# Patient Record
Sex: FEMALE | Race: WHITE | NOT HISPANIC OR LATINO | Employment: FULL TIME | URBAN - METROPOLITAN AREA
[De-identification: names, ages, dates, MRNs, and addresses within clinical notes are randomized per-mention and may not be internally consistent; named-entity substitution may affect disease eponyms.]

---

## 2017-05-10 ENCOUNTER — OFFICE VISIT (OUTPATIENT)
Dept: URGENT CARE | Facility: CLINIC | Age: 22
End: 2017-05-10
Payer: COMMERCIAL

## 2017-05-10 VITALS
RESPIRATION RATE: 20 BRPM | TEMPERATURE: 97.4 F | WEIGHT: 118 LBS | SYSTOLIC BLOOD PRESSURE: 120 MMHG | HEART RATE: 98 BPM | DIASTOLIC BLOOD PRESSURE: 80 MMHG | BODY MASS INDEX: 21.58 KG/M2 | OXYGEN SATURATION: 98 %

## 2017-05-10 DIAGNOSIS — M62.838 MUSCLE SPASM: ICD-10-CM

## 2017-05-10 DIAGNOSIS — M54.6 ACUTE BILATERAL THORACIC BACK PAIN: ICD-10-CM

## 2017-05-10 PROCEDURE — 99213 OFFICE O/P EST LOW 20 MIN: CPT | Mod: 25 | Performed by: PHYSICIAN ASSISTANT

## 2017-05-10 PROCEDURE — 96372 THER/PROPH/DIAG INJ SC/IM: CPT | Performed by: PHYSICIAN ASSISTANT

## 2017-05-10 RX ORDER — KETOROLAC TROMETHAMINE 30 MG/ML
60 INJECTION, SOLUTION INTRAMUSCULAR; INTRAVENOUS ONCE
Status: COMPLETED | OUTPATIENT
Start: 2017-05-10 | End: 2017-05-10

## 2017-05-10 RX ORDER — BACLOFEN 10 MG/1
10 TABLET ORAL 3 TIMES DAILY
Qty: 15 TAB | Refills: 0 | Status: SHIPPED | OUTPATIENT
Start: 2017-05-10 | End: 2017-05-15

## 2017-05-10 RX ADMIN — KETOROLAC TROMETHAMINE 60 MG: 30 INJECTION, SOLUTION INTRAMUSCULAR; INTRAVENOUS at 08:40

## 2017-05-10 ASSESSMENT — ENCOUNTER SYMPTOMS
BLURRED VISION: 0
PERIANAL NUMBNESS: 0
EYE REDNESS: 0
BACK PAIN: 1
FALLS: 0
MYALGIAS: 0
VOMITING: 0
TINGLING: 0
CHILLS: 0
FEVER: 0
ABDOMINAL PAIN: 0
NUMBNESS: 0
COUGH: 0
WHEEZING: 0
DIARRHEA: 0
SORE THROAT: 0
BOWEL INCONTINENCE: 0
EYE DISCHARGE: 0
HEADACHES: 0
DIZZINESS: 0
NECK PAIN: 1

## 2017-05-10 NOTE — PROGRESS NOTES
"Subjective:      Kamila Kapadia is a 21 y.o. female who presents with Shoulder Pain          Pt is 20 y/o female who presents with upper back pain that radiates to both scapulas off/on for the last 3 days. She reports that she can't \"think of anything that she might of done\"- denies any injury, trauma, or fall. She reports that she barely had the pain yesterday, but then this morning she started to get up from bed this morning and the pain was \"so bad this morning\". She reports putting on OTC pain relief \"pads\" with great relief this morning, and she took Tylenol. She denies any current pain along the spine at this time, more on the sides of the spine. She denies any SOB, CP, or cough. She also denies any neck stiffness or HA at this time. Denies any parasthesias.   Back Pain  This is a new problem. Episode onset: 3 days ago. The problem occurs constantly. The problem has been waxing and waning since onset. The pain is present in the thoracic spine. The quality of the pain is described as stabbing (Spasm). The pain does not radiate. The pain is at a severity of 5/10. The pain is moderate. The symptoms are aggravated by position. Pertinent negatives include no abdominal pain, bladder incontinence, bowel incontinence, chest pain, dysuria, fever, headaches, numbness, perianal numbness or tingling. She has tried analgesics for the symptoms. The treatment provided mild relief.       Review of Systems   Constitutional: Negative for fever and chills.   HENT: Negative for ear discharge, ear pain and sore throat.    Eyes: Negative for blurred vision, discharge and redness.   Respiratory: Negative for cough and wheezing.    Cardiovascular: Negative for chest pain and leg swelling.   Gastrointestinal: Negative for vomiting, abdominal pain, diarrhea and bowel incontinence.   Genitourinary: Negative for bladder incontinence and dysuria.   Musculoskeletal: Positive for back pain and neck pain. Negative for myalgias, joint pain and " falls.   Skin: Negative for itching and rash.   Neurological: Negative for dizziness, tingling, numbness and headaches.          Objective:     /80 mmHg  Pulse 98  Temp(Src) 36.3 °C (97.4 °F)  Resp 20  Wt 53.524 kg (118 lb)  SpO2 98%   PMH:  has no past medical history on file.  MEDS:   Current outpatient prescriptions:   •  baclofen (LIORESAL) 10 MG Tab, Take 1 Tab by mouth 3 times a day for 5 days., Disp: 15 Tab, Rfl: 0  •  fluconazole (DIFLUCAN) 100 MG Tab, Take 1.5 Tabs by mouth every day., Disp: 51 Tab, Rfl: 0  •  fluconazole (DIFLUCAN) 150 MG tablet, Take 150 mg PO q 3 days x 3 doses then once a week for 6 month, Disp: 27 Tab, Rfl: 0  •  fluconazole (DIFLUCAN) 150 MG tablet, Take one tab PO, wait 4 days, if still w/ s/sx take second tab, Disp: 2 Tab, Rfl: 1  •  fluconazole (DIFLUCAN) 150 MG tablet, Take 1 Tab by mouth every day., Disp: 1 Tab, Rfl: 0  •  fluconazole (DIFLUCAN) 150 MG tablet, Take one tablet by mouth once weekly x 2 weeks., Disp: 2 Tab, Rfl: 2  •  medroxyPROGESTERone (DEPO-PROVERA) 150 MG/ML SUSP, 150 mg by Intramuscular route. Every 90 days, Disp: , Rfl:     Current facility-administered medications:   •  ketorolac (TORADOL) injection (60 mg/2mL vial), 60 mg, Intramuscular, Once, Maurice Wyman PA-C  ALLERGIES: No Known Allergies  SURGHX: History reviewed. No pertinent past surgical history.  SOCHX:  reports that she has never smoked. She has never used smokeless tobacco. She reports that she does not drink alcohol or use illicit drugs.  FH: Family history was reviewed, no pertinent findings to report    Physical Exam   Constitutional: She is oriented to person, place, and time. She appears well-developed and well-nourished.   HENT:   Head: Normocephalic and atraumatic.   Eyes: EOM are normal. Pupils are equal, round, and reactive to light.   Neck: Normal range of motion. Neck supple.   Cardiovascular: Normal rate and regular rhythm.    No murmur heard.  Pulmonary/Chest: Effort  normal. No respiratory distress.   Musculoskeletal:        Cervical back: She exhibits tenderness and spasm. She exhibits normal range of motion, no bony tenderness and no swelling.        Back:    Without ANY midline spinal tenderness- neg. Spurlings, slight parspinous spasm. Without bony stepoff or crepitus.    equal. N/V intact distally.    Lymphadenopathy:     She has no cervical adenopathy.   Neurological: She is alert and oriented to person, place, and time.   Skin: Skin is warm. No rash noted.   Psychiatric: She has a normal mood and affect. Her behavior is normal.   Vitals reviewed.              Assessment/Plan:     1. Acute bilateral thoracic back pain  - baclofen (LIORESAL) 10 MG Tab; Take 1 Tab by mouth 3 times a day for 5 days.  Dispense: 15 Tab; Refill: 0  - ketorolac (TORADOL) injection (60 mg/2mL vial); 2 mL by Intramuscular route Once.    2. Muscle spasm    At this time patient is without any bony tenderness, spinal tenderness, crepitus, or stepd off- without injury or midline tenderness- imaging was deferred at this time- however if patient is not improving- worsening symptoms- she will need to RTC for further evaluation to include imaging.   After 15 min. Recheck after Toradol today patient had slight improvement. Work note for today.   Patient given precautionary s/sx that mandate immediate follow up and evaluation in the ED. Advised of risks of not doing so.    DDX, Supportive care, and indications for immediate follow-up discussed with patient.    Instructed to return to clinic or nearest emergency department if we are not available for any change in condition, further concerns, or worsening of symptoms.    The patient demonstrated a good understanding and agreed with the treatment plan.

## 2017-05-10 NOTE — Clinical Note
May 10, 2017         Patient: Kamila Kapadia   YOB: 1995   Date of Visit: 5/10/2017           To Whom it May Concern:    Kamila Kapadia was seen in my clinic on 5/10/2017. Please excuse this patient from work today due to recent ailment.     If you have any questions or concerns, please don't hesitate to call.        Sincerely,           Maurice Wyman PA-C  Electronically Signed

## 2017-05-10 NOTE — MR AVS SNAPSHOT
Kamila Steffi   5/10/2017 8:15 AM   Office Visit   MRN: [id number]    Department:  [facility]   Dept Phone:  [phone]    Description:  Female : 1995   Provider:  Maurice Wyman PA-C           Reason for Visit     Shoulder Pain Couple days neck and  shoulder blade pain      Allergies as of 5/10/2017     No Known Allergies      You were diagnosed with     Acute bilateral thoracic back pain   [0456941]       Muscle spasm   [[id number]]         Vital Signs     Blood Pressure Pulse Temperature Respirations Weight Oxygen Saturation    120/80 mmHg 98 36.3 °C (97.4 °F) 20 53.524 kg (118 lb) 98%    Smoking Status                   Never Smoker            Basic Information     Date Of Birth Sex Race Ethnicity Preferred Language    1995 Female White Non- English      Health Maintenance        Date Due Completion Dates    IMM HEP A VACCINE (1 of 2 - Standard Series) 1994 ---    IMM HPV VACCINE (1 of 3 - Female 3 Dose Series) 2004 ---    IMM VARICELLA (CHICKENPOX) VACCINE (1 of 2 - 2 Dose Adolescent Series) 2006 ---    PAP SMEAR 2014 ---    IMM DTaP/Tdap/Td Vaccine (7 - Td) 5/10/2021 5/10/2011, 1999, 7/3/1995, 1994, 1994, 3/3/1994            Current Immunizations     Dtap Vaccine 1999, 7/3/1995, 1994, 1994, 3/3/1994    HIB Vaccine (ACTHIB/HIBERIX) 7/3/1995, 1994, 1994, 3/3/1994    Hepatitis B Vaccine Non-Recombivax (Ped/Adol) 1994, 1994, 3/3/1994    IPV 1999, 1994, 1994, 3/3/1994    MMR Vaccine 1999, 7/3/1995    Tdap Vaccine 5/10/2011    Tuberculin Skin Test 2016  8:38 AM, 2016 12:36 PM      Below and/or attached are the medications your provider expects you to take. Review all of your home medications and newly ordered medications with your provider and/or pharmacist. Follow medication instructions as directed by your provider and/or pharmacist. Please keep your medication list with you and  share with your provider. Update the information when medications are discontinued, doses are changed, or new medications (including over-the-counter products) are added; and carry medication information at all times in the event of emergency situations     Allergies:  No Known Allergies          Medications  Valid as of: May 10, 2017 -  9:07 AM    Generic Name Brand Name Tablet Size Instructions for use    Baclofen (Tab) LIORESAL 10 MG Take 1 Tab by mouth 3 times a day for 5 days.        Fluconazole (Tab) DIFLUCAN 150 MG Take one tablet by mouth once weekly x 2 weeks.        Fluconazole (Tab) DIFLUCAN 150 MG Take 1 Tab by mouth every day.        Fluconazole (Tab) DIFLUCAN 150 MG Take one tab PO, wait 4 days, if still w/ s/sx take second tab        Fluconazole (Tab) DIFLUCAN 150 MG Take 150 mg PO q 3 days x 3 doses then once a week for 6 month        Fluconazole (Tab) DIFLUCAN 100 MG Take 1.5 Tabs by mouth every day.        MedroxyPROGESTERone Acetate (Suspension) DEPO-PROVERA 150 MG/ mg by Intramuscular route. Every 90 days        .                 Medicines prescribed today were sent to:     [facility] [address] [place] [id number]    Phone: [phone] Fax: [phone]    Open 24 Hours?: No      Medication refill instructions:       If your prescription bottle indicates you have medication refills left, it is not necessary to call your provider’s office. Please contact your pharmacy and they will refill your medication.    If your prescription bottle indicates you do not have any refills left, you may request refills at any time through one of the following ways: The online [employer] system (except Urgent Care), by calling your provider’s office, or by asking your pharmacy to contact your provider’s office with a refill request. Medication refills are processed only during regular business hours and may not be available until the next business  day. Your provider may request additional information or to have a follow-up visit with you prior to refilling your medication.   *Please Note: Medication refills are assigned a new Rx number when refilled electronically. Your pharmacy may indicate that no refills were authorized even though a new prescription for the same medication is available at the pharmacy. Please request the medicine by name with the pharmacy before contacting your provider for a refill.           [email or web address] Access Code: Activation code not generated  Current [employer] Status: Active

## 2017-05-16 ENCOUNTER — OFFICE VISIT (OUTPATIENT)
Dept: URGENT CARE | Facility: PHYSICIAN GROUP | Age: 22
End: 2017-05-16
Payer: COMMERCIAL

## 2017-05-16 ENCOUNTER — HOSPITAL ENCOUNTER (OUTPATIENT)
Facility: MEDICAL CENTER | Age: 22
End: 2017-05-16
Attending: NURSE PRACTITIONER
Payer: COMMERCIAL

## 2017-05-16 VITALS
BODY MASS INDEX: 21.71 KG/M2 | OXYGEN SATURATION: 99 % | TEMPERATURE: 98.6 F | DIASTOLIC BLOOD PRESSURE: 60 MMHG | HEART RATE: 84 BPM | WEIGHT: 118 LBS | RESPIRATION RATE: 16 BRPM | HEIGHT: 62 IN | SYSTOLIC BLOOD PRESSURE: 100 MMHG

## 2017-05-16 DIAGNOSIS — B37.31 VAGINAL YEAST INFECTION: ICD-10-CM

## 2017-05-16 DIAGNOSIS — N89.8 VAGINAL DISCHARGE: ICD-10-CM

## 2017-05-16 DIAGNOSIS — N89.8 VAGINAL ITCHING: ICD-10-CM

## 2017-05-16 PROCEDURE — 87480 CANDIDA DNA DIR PROBE: CPT

## 2017-05-16 PROCEDURE — 99000 SPECIMEN HANDLING OFFICE-LAB: CPT | Performed by: NURSE PRACTITIONER

## 2017-05-16 PROCEDURE — 87660 TRICHOMONAS VAGIN DIR PROBE: CPT

## 2017-05-16 PROCEDURE — 87510 GARDNER VAG DNA DIR PROBE: CPT

## 2017-05-16 PROCEDURE — 99214 OFFICE O/P EST MOD 30 MIN: CPT | Performed by: NURSE PRACTITIONER

## 2017-05-16 RX ORDER — FLUCONAZOLE 150 MG/1
150 TABLET ORAL ONCE
Qty: 2 TAB | Refills: 0 | Status: SHIPPED | OUTPATIENT
Start: 2017-05-16 | End: 2017-05-16

## 2017-05-16 ASSESSMENT — ENCOUNTER SYMPTOMS
VOMITING: 0
CHILLS: 0
HEADACHES: 0
DIARRHEA: 0
ABDOMINAL PAIN: 0
FEVER: 0
CONSTIPATION: 0
BACK PAIN: 0
FLANK PAIN: 0
MYALGIAS: 0
WEAKNESS: 0
NAUSEA: 0
DIZZINESS: 0

## 2017-05-16 NOTE — MR AVS SNAPSHOT
"        Kamila Steffi   2017 5:10 PM   Office Visit   MRN: [id number]    Department:  [facility]   Dept Phone:  [phone]    Description:  Female : 1995   Provider:  SUNNY Singh           Reason for Visit     Vaginal Itching 1 day      Allergies as of 2017     No Known Allergies      You were diagnosed with     Vaginal itching   [350712]       Vaginal yeast infection   [[id number]]       Vaginal discharge   [[id number]]         Vital Signs     Blood Pressure Pulse Temperature Respirations Height Weight    100/60 mmHg 84 37 °C (98.6 °F) 16 1.575 m (5' 2.01\") 53.524 kg (118 lb)    Body Mass Index Oxygen Saturation Last Menstrual Period Breastfeeding? Smoking Status       21.58 kg/m2 99% 2017 No Never Smoker        Basic Information     Date Of Birth Sex Race Ethnicity Preferred Language    1995 Female White Non- English      Health Maintenance        Date Due Completion Dates    IMM HEP A VACCINE (1 of 2 - Standard Series) 1994 ---    IMM HPV VACCINE (1 of 3 - Female 3 Dose Series) 2004 ---    IMM VARICELLA (CHICKENPOX) VACCINE (1 of 2 - 2 Dose Adolescent Series) 2006 ---    PAP SMEAR 2014 ---    IMM DTaP/Tdap/Td Vaccine (7 - Td) 5/10/2021 5/10/2011, 1999, 7/3/1995, 1994, 1994, 3/3/1994            Current Immunizations     Dtap Vaccine 1999, 7/3/1995, 1994, 1994, 3/3/1994    HIB Vaccine (ACTHIB/HIBERIX) 7/3/1995, 1994, 1994, 3/3/1994    Hepatitis B Vaccine Non-Recombivax (Ped/Adol) 1994, 1994, 3/3/1994    IPV 1999, 1994, 1994, 3/3/1994    MMR Vaccine 1999, 7/3/1995    Tdap Vaccine 5/10/2011    Tuberculin Skin Test 2016  8:38 AM, 2016 12:36 PM      Below and/or attached are the medications your provider expects you to take. Review all of your home medications and newly ordered medications with your provider and/or pharmacist. Follow medication instructions as directed by " your provider and/or pharmacist. Please keep your medication list with you and share with your provider. Update the information when medications are discontinued, doses are changed, or new medications (including over-the-counter products) are added; and carry medication information at all times in the event of emergency situations     Allergies:  No Known Allergies          Medications  Valid as of: May 16, 2017 -  6:12 PM    Generic Name Brand Name Tablet Size Instructions for use    Fluconazole (Tab) DIFLUCAN 150 MG Take one tablet by mouth once weekly x 2 weeks.        Fluconazole (Tab) DIFLUCAN 150 MG Take 1 Tab by mouth every day.        Fluconazole (Tab) DIFLUCAN 150 MG Take one tab PO, wait 4 days, if still w/ s/sx take second tab        Fluconazole (Tab) DIFLUCAN 150 MG Take 150 mg PO q 3 days x 3 doses then once a week for 6 month        Fluconazole (Tab) DIFLUCAN 100 MG Take 1.5 Tabs by mouth every day.        Fluconazole (Tab) DIFLUCAN 150 MG Take 1 Tab by mouth Once for 1 dose. Repeat in 3 days if no improvement        MedroxyPROGESTERone Acetate (Suspension) DEPO-PROVERA 150 MG/ mg by Intramuscular route. Every 90 days        .                 Medicines prescribed today were sent to:     [facility] [address] [place] [id number]    Phone: [phone] Fax: [phone]    Open 24 Hours?: No      Medication refill instructions:       If your prescription bottle indicates you have medication refills left, it is not necessary to call your provider’s office. Please contact your pharmacy and they will refill your medication.    If your prescription bottle indicates you do not have any refills left, you may request refills at any time through one of the following ways: The online [employer] system (except Urgent Care), by calling your provider’s office, or by asking your pharmacy to contact your provider’s office with a refill request. Medication  refills are processed only during regular business hours and may not be available until the next business day. Your provider may request additional information or to have a follow-up visit with you prior to refilling your medication.   *Please Note: Medication refills are assigned a new Rx number when refilled electronically. Your pharmacy may indicate that no refills were authorized even though a new prescription for the same medication is available at the pharmacy. Please request the medicine by name with the pharmacy before contacting your provider for a refill.        Your To Do List     Future Labs/Procedures Complete By Expires    VAGINAL PATHOGENS DNA PANEL  As directed 5/16/2018         [employer] Access Code: Activation code not generated  Current [employer] Status: Active

## 2017-05-17 DIAGNOSIS — N89.8 VAGINAL DISCHARGE: ICD-10-CM

## 2017-05-17 LAB
CANDIDA DNA VAG QL PROBE+SIG AMP: NEGATIVE
G VAGINALIS DNA VAG QL PROBE+SIG AMP: NEGATIVE
T VAGINALIS DNA VAG QL PROBE+SIG AMP: NEGATIVE

## 2017-05-17 NOTE — PROGRESS NOTES
Subjective:      Kamila Kapadia is a 21 y.o. female who presents with Vaginal Itching            Vaginal Itching  Pertinent negatives include no abdominal pain, chills, fever, headaches, myalgias, nausea, vomiting or weakness.   Kamila is a 21 year old female who is here for vaginal itching since yesterday. H/o recurrent yeast infections. Tried Monistat cream with no help. States boyfriend said she had white vaginal d/c last night. Denies dysuria or hematuria. LMP 2 weeks ago. States will get a yeast infection after every menstrual cycle. Denies STD exposure. States in monogamous heterosexual relationship with boyfriend x 3 years.    PMH:  has no past medical history on file.  MEDS:   Current outpatient prescriptions:   •  fluconazole (DIFLUCAN) 150 MG tablet, Take 1 Tab by mouth Once for 1 dose. Repeat in 3 days if no improvement, Disp: 2 Tab, Rfl: 0  •  fluconazole (DIFLUCAN) 100 MG Tab, Take 1.5 Tabs by mouth every day., Disp: 51 Tab, Rfl: 0  •  fluconazole (DIFLUCAN) 150 MG tablet, Take 150 mg PO q 3 days x 3 doses then once a week for 6 month, Disp: 27 Tab, Rfl: 0  •  fluconazole (DIFLUCAN) 150 MG tablet, Take one tab PO, wait 4 days, if still w/ s/sx take second tab, Disp: 2 Tab, Rfl: 1  •  fluconazole (DIFLUCAN) 150 MG tablet, Take 1 Tab by mouth every day., Disp: 1 Tab, Rfl: 0  •  fluconazole (DIFLUCAN) 150 MG tablet, Take one tablet by mouth once weekly x 2 weeks., Disp: 2 Tab, Rfl: 2  •  medroxyPROGESTERone (DEPO-PROVERA) 150 MG/ML SUSP, 150 mg by Intramuscular route. Every 90 days, Disp: , Rfl:   ALLERGIES: No Known Allergies  SURGHX: History reviewed. No pertinent past surgical history.  SOCHX:  reports that she has never smoked. She has never used smokeless tobacco. She reports that she does not drink alcohol or use illicit drugs.  FH: Family history was reviewed, no pertinent findings to report       Review of Systems   Constitutional: Negative for fever, chills and malaise/fatigue.   Gastrointestinal:  "Negative for nausea, vomiting, abdominal pain, diarrhea and constipation.   Genitourinary: Negative for dysuria, urgency, frequency, hematuria and flank pain.   Musculoskeletal: Negative for myalgias and back pain.   Neurological: Negative for dizziness, weakness and headaches.   All other systems reviewed and are negative.         Objective:     /60 mmHg  Pulse 84  Temp(Src) 37 °C (98.6 °F)  Resp 16  Ht 1.575 m (5' 2.01\")  Wt 53.524 kg (118 lb)  BMI 21.58 kg/m2  SpO2 99%  LMP 05/05/2017  Breastfeeding? No     Physical Exam   Constitutional: She is oriented to person, place, and time. She appears well-developed and well-nourished. No distress.   HENT:   Head: Normocephalic.   Eyes: Conjunctivae and EOM are normal. Pupils are equal, round, and reactive to light.   Cardiovascular: Normal rate.    Pulmonary/Chest: Effort normal.   Genitourinary: Pelvic exam was performed with patient supine. There is no rash, tenderness, lesion or injury on the right labia. There is no rash, tenderness, lesion or injury on the left labia. Cervix exhibits discharge. There is erythema in the vagina. No tenderness or bleeding in the vagina. No foreign body around the vagina. No signs of injury around the vagina. Vaginal discharge found.   Pelvic: external genitalia normal without external white discharge,vaginal mucosa normal, minimal watery white vaginal discharge, cultures collected for BV but not GC/Chlamydia. Patient tolerated procedure well with minimal discomfort.        Musculoskeletal: Normal range of motion.   Neurological: She is alert and oriented to person, place, and time.   Skin: Skin is warm and dry. She is not diaphoretic.   Vitals reviewed.              Assessment/Plan:     1. Vaginal itching    - fluconazole (DIFLUCAN) 150 MG tablet; Take 1 Tab by mouth Once for 1 dose. Repeat in 3 days if no improvement  Dispense: 2 Tab; Refill: 0    2. Vaginal yeast infection    - fluconazole (DIFLUCAN) 150 MG tablet; " Take 1 Tab by mouth Once for 1 dose. Repeat in 3 days if no improvement  Dispense: 2 Tab; Refill: 0    3. Vaginal discharge    - VAGINAL PATHOGENS DNA PANEL; Future    Patient notified that if yeast infection reoccurs or persists, use OTC Miconazole for 7 days instead of one or three day treatment   Increase water intake  Urinate more frequently and empty bladder completely  Practice good toileting hygiene after bowel movements and sexual intercourse, refrain from sexual intercourse until infection cleared  Monitor for back/flank pain, difficulty urinating, blood in urine- need re-evaluation

## 2017-05-19 ENCOUNTER — TELEPHONE (OUTPATIENT)
Dept: URGENT CARE | Facility: PHYSICIAN GROUP | Age: 22
End: 2017-05-19

## 2017-05-19 NOTE — TELEPHONE ENCOUNTER
Called and left message on cell phone regarding results for vaginal culture, may call if any questions

## 2017-06-15 ENCOUNTER — OFFICE VISIT (OUTPATIENT)
Dept: URGENT CARE | Facility: PHYSICIAN GROUP | Age: 22
End: 2017-06-15
Payer: COMMERCIAL

## 2017-06-15 VITALS
TEMPERATURE: 99.6 F | RESPIRATION RATE: 16 BRPM | OXYGEN SATURATION: 96 % | HEIGHT: 62 IN | HEART RATE: 92 BPM | WEIGHT: 117 LBS | SYSTOLIC BLOOD PRESSURE: 100 MMHG | BODY MASS INDEX: 21.53 KG/M2 | DIASTOLIC BLOOD PRESSURE: 62 MMHG

## 2017-06-15 DIAGNOSIS — B37.31 VAGINA, CANDIDIASIS: ICD-10-CM

## 2017-06-15 LAB
APPEARANCE UR: CLEAR
BILIRUB UR STRIP-MCNC: NORMAL MG/DL
COLOR UR AUTO: YELLOW
GLUCOSE BLD-MCNC: 85 MG/DL (ref 70–100)
GLUCOSE UR STRIP.AUTO-MCNC: NORMAL MG/DL
KETONES UR STRIP.AUTO-MCNC: NORMAL MG/DL
LEUKOCYTE ESTERASE UR QL STRIP.AUTO: NORMAL
NITRITE UR QL STRIP.AUTO: NORMAL
PH UR STRIP.AUTO: 7 [PH] (ref 5–8)
PROT UR QL STRIP: NORMAL MG/DL
RBC UR QL AUTO: NORMAL
SP GR UR STRIP.AUTO: 1.01
UROBILINOGEN UR STRIP-MCNC: NORMAL MG/DL

## 2017-06-15 PROCEDURE — 82962 GLUCOSE BLOOD TEST: CPT | Performed by: PHYSICIAN ASSISTANT

## 2017-06-15 PROCEDURE — 81002 URINALYSIS NONAUTO W/O SCOPE: CPT | Performed by: PHYSICIAN ASSISTANT

## 2017-06-15 PROCEDURE — 99214 OFFICE O/P EST MOD 30 MIN: CPT | Performed by: PHYSICIAN ASSISTANT

## 2017-06-15 RX ORDER — FLUCONAZOLE 150 MG/1
150 TABLET ORAL
Qty: 5 TAB | Refills: 0 | Status: SHIPPED | OUTPATIENT
Start: 2017-06-15 | End: 2017-07-06

## 2017-06-15 ASSESSMENT — ENCOUNTER SYMPTOMS
FEVER: 0
NAUSEA: 0
CHILLS: 0
VOMITING: 0
PALPITATIONS: 0
HEADACHES: 0
COUGH: 0
MYALGIAS: 0
DIZZINESS: 0
VAGINITIS: 1
ABDOMINAL PAIN: 0

## 2017-06-15 NOTE — MR AVS SNAPSHOT
"        Kamila Steffi   6/15/2017 1:50 PM   Office Visit   MRN: [id number]    Department:  [facility]   Dept Phone:  [phone]    Description:  Female : 1995   Provider:  Daquan Gutierrez PA-C           Reason for Visit     Vaginitis was seen on 2017, keeps having yeast infections after periods, itching and discharge      Allergies as of 6/15/2017     No Known Allergies      You were diagnosed with     Vagina, candidiasis   [338607]         Vital Signs     Blood Pressure Pulse Temperature Respirations Height Weight    100/62 mmHg 92 37.6 °C (99.6 °F) 16 1.575 m (5' 2.01\") 53.071 kg (117 lb)    Body Mass Index Oxygen Saturation Last Menstrual Period Smoking Status          21.39 kg/m2 96% 2017 Never Smoker         Basic Information     Date Of Birth Sex Race Ethnicity Preferred Language    1995 Female White Non- English      Health Maintenance        Date Due Completion Dates    IMM HEP A VACCINE (1 of 2 - Standard Series) 1994 ---    IMM HPV VACCINE (1 of 3 - Female 3 Dose Series) 2004 ---    IMM VARICELLA (CHICKENPOX) VACCINE (1 of 2 - 2 Dose Adolescent Series) 2006 ---    PAP SMEAR 2014 ---    IMM DTaP/Tdap/Td Vaccine (7 - Td) 5/10/2021 5/10/2011, 1999, 7/3/1995, 1994, 1994, 3/3/1994            Results     POCT Glucose      Component Value Standard Range & Units    Glucose - Accu-Ck 85 70 - 100 mg/dL                POCT Urinalysis      Component Value Standard Range & Units    POC Color yellow Negative    POC Appearance clear Negative    POC Leukocyte Esterase neg Negative    POC Nitrites neg Negative    POC Urobiligen neg Negative (0.2) mg/dL    POC Protein neg Negative mg/dL    POC Urine PH 7 5.0 - 8.0    POC Blood neg Negative    POC Specific Gravity 1.015 <1.005 - >1.030    POC Ketones neg Negative mg/dL    POC Biliruben neg Negative mg/dL    POC Glucose neg Negative mg/dL                        Current Immunizations     Dtap " Vaccine 6/30/1999, 7/3/1995, 6/27/1994, 5/5/1994, 3/3/1994    HIB Vaccine (ACTHIB/HIBERIX) 7/3/1995, 6/27/1994, 5/5/1994, 3/3/1994    Hepatitis B Vaccine Non-Recombivax (Ped/Adol) 6/27/1994, 5/5/1994, 3/3/1994    IPV 6/30/1999, 6/27/1994, 5/5/1994, 3/3/1994    MMR Vaccine 6/30/1999, 7/3/1995    Tdap Vaccine 5/10/2011    Tuberculin Skin Test 11/9/2016  8:38 AM, 11/2/2016 12:36 PM      Below and/or attached are the medications your provider expects you to take. Review all of your home medications and newly ordered medications with your provider and/or pharmacist. Follow medication instructions as directed by your provider and/or pharmacist. Please keep your medication list with you and share with your provider. Update the information when medications are discontinued, doses are changed, or new medications (including over-the-counter products) are added; and carry medication information at all times in the event of emergency situations     Allergies:  No Known Allergies          Medications  Valid as of: Delmis 15, 2017 -  2:35 PM    Generic Name Brand Name Tablet Size Instructions for use    Fluconazole (Tab) DIFLUCAN 150 MG Take one tablet by mouth once weekly x 2 weeks.        Fluconazole (Tab) DIFLUCAN 150 MG Take 1 Tab by mouth every day.        Fluconazole (Tab) DIFLUCAN 150 MG Take one tab PO, wait 4 days, if still w/ s/sx take second tab        Fluconazole (Tab) DIFLUCAN 150 MG Take 150 mg PO q 3 days x 3 doses then once a week for 6 month        Fluconazole (Tab) DIFLUCAN 100 MG Take 1.5 Tabs by mouth every day.        Fluconazole (Tab) DIFLUCAN 150 MG Take 1 Tab by mouth every 48 hours as needed (vaginal itch, discharge).        MedroxyPROGESTERone Acetate (Suspension) DEPO-PROVERA 150 MG/ mg by Intramuscular route. Every 90 days        .                 Medicines prescribed today were sent to:     [facility] [address] [place] [address], [place] - [address] [place] [address]     Phone: [phone] Fax: [phone]    Open 24 Hours?: No      Medication refill instructions:       If your prescription bottle indicates you have medication refills left, it is not necessary to call your provider’s office. Please contact your pharmacy and they will refill your medication.    If your prescription bottle indicates you do not have any refills left, you may request refills at any time through one of the following ways: The online [employer] system (except Urgent Care), by calling your provider’s office, or by asking your pharmacy to contact your provider’s office with a refill request. Medication refills are processed only during regular business hours and may not be available until the next business day. Your provider may request additional information or to have a follow-up visit with you prior to refilling your medication.   *Please Note: Medication refills are assigned a new Rx number when refilled electronically. Your pharmacy may indicate that no refills were authorized even though a new prescription for the same medication is available at the pharmacy. Please request the medicine by name with the pharmacy before contacting your provider for a refill.           ORDISSIMO Access Code: Activation code not generated  Current [employer] Status: Active

## 2017-06-15 NOTE — PROGRESS NOTES
"Subjective:      Kamila Kapadia is a 21 y.o. female who presents with Vaginitis    Current medications reviewed.  No past medical history on file.  Social History   Substance Use Topics   • Smoking status: Never Smoker    • Smokeless tobacco: Never Used   • Alcohol Use: No     Family History Reviewed: noncontributory      Patient with chronic intermittent yeast infections, has been tested x 4 on chart, OBGYN has tested and so has urgent care.  She has taken diflucan several times with resolution for a few weeks.      Vaginitis  Pertinent negatives include no abdominal pain, chills, dysuria, fever, frequency, headaches, joint pain, nausea, urgency or vomiting.       Review of Systems   Constitutional: Negative for fever and chills.   Respiratory: Negative for cough.    Cardiovascular: Negative for chest pain and palpitations.   Gastrointestinal: Negative for nausea, vomiting and abdominal pain.   Genitourinary: Negative for dysuria, urgency and frequency.        Vaginal itching and discharge   Musculoskeletal: Negative for myalgias and joint pain.   Neurological: Negative for dizziness and headaches.   All other systems reviewed and are negative.         Objective:     /62 mmHg  Pulse 92  Temp(Src) 37.6 °C (99.6 °F)  Resp 16  Ht 1.575 m (5' 2.01\")  Wt 53.071 kg (117 lb)  BMI 21.39 kg/m2  SpO2 96%  LMP 05/31/2017     Physical Exam   Constitutional: She is oriented to person, place, and time. She appears well-developed and well-nourished.   Cardiovascular: Normal rate and regular rhythm.    Pulmonary/Chest: Effort normal and breath sounds normal.   Genitourinary:   Patient denied exam today.   Neurological: She is alert and oriented to person, place, and time.   Skin: Skin is warm and dry.   Nursing note and vitals reviewed.              Assessment/Plan:     1. Vagina, candidiasis  POCT Glucose    fluconazole (DIFLUCAN) 150 MG tablet    POCT Urinalysis     Glucose 85; UA: clear, no culture  Probiotic, cotton " underwear, follow up with GYN for long term prevention ideas.    RX diflucan.  Patient reports understanding and agrees with plan.

## 2017-07-06 ENCOUNTER — OFFICE VISIT (OUTPATIENT)
Dept: URGENT CARE | Facility: PHYSICIAN GROUP | Age: 22
End: 2017-07-06
Payer: COMMERCIAL

## 2017-07-06 VITALS
HEIGHT: 62 IN | BODY MASS INDEX: 22.45 KG/M2 | DIASTOLIC BLOOD PRESSURE: 64 MMHG | OXYGEN SATURATION: 100 % | HEART RATE: 74 BPM | TEMPERATURE: 98.6 F | WEIGHT: 122 LBS | RESPIRATION RATE: 12 BRPM | SYSTOLIC BLOOD PRESSURE: 100 MMHG

## 2017-07-06 DIAGNOSIS — N89.8 VAGINAL ITCHING: ICD-10-CM

## 2017-07-06 DIAGNOSIS — N89.8 VAGINAL DISCHARGE: ICD-10-CM

## 2017-07-06 PROCEDURE — 99214 OFFICE O/P EST MOD 30 MIN: CPT | Performed by: NURSE PRACTITIONER

## 2017-07-06 RX ORDER — FLUCONAZOLE 150 MG/1
150 TABLET ORAL DAILY
Qty: 1 TAB | Refills: 2 | Status: SHIPPED | OUTPATIENT
Start: 2017-07-06 | End: 2017-07-20

## 2017-07-06 NOTE — MR AVS SNAPSHOT
"Kamila Kapadia   2017 4:05 PM   Office Visit   MRN: [id number]    Department:  [facility]   Dept Phone:  [phone]    Description:  Female : 1995   Provider:  EDDIE Chapin           Reason for Visit     Vaginal Discharge vaginal itching starting yesterday       Allergies as of 2017     No Known Allergies      You were diagnosed with     Vaginal itching   [[id number]]       Vaginal discharge   [2015]         Vital Signs     Blood Pressure Pulse Temperature Respirations Height Weight    100/64 mmHg 74 37 °C (98.6 °F) 12 1.575 m (5' 2\") 55.339 kg (122 lb)    Body Mass Index Oxygen Saturation Last Menstrual Period Smoking Status          22.31 kg/m2 100% 2017 Never Smoker         Basic Information     Date Of Birth Sex Race Ethnicity Preferred Language    1995 Female White Non- English      Your appointments     Aug 31, 2017 10:45 AM   GYN Visit with Sonja Sanders M.D.   [facility]    [address]  [facility] [address]   [phone]              Health Maintenance        Date Due Completion Dates    IMM HEP A VACCINE (1 of 2 - Standard Series) 1994 ---    IMM HPV VACCINE (1 of 3 - Female 3 Dose Series) 2004 ---    IMM VARICELLA (CHICKENPOX) VACCINE (1 of 2 - 2 Dose Adolescent Series) 2006 ---    PAP SMEAR 2014 ---    IMM INFLUENZA (1) 2017 ---    IMM DTaP/Tdap/Td Vaccine (7 - Td) 5/10/2021 5/10/2011, 1999, 7/3/1995, 1994, 1994, 3/3/1994            Current Immunizations     Dtap Vaccine 1999, 7/3/1995, 1994, 1994, 3/3/1994    HIB Vaccine (ACTHIB/HIBERIX) 7/3/1995, 1994, 1994, 3/3/1994    Hepatitis B Vaccine Non-Recombivax (Ped/Adol) 1994, 1994, 3/3/1994    IPV 1999, 1994, 1994, 3/3/1994    MMR Vaccine 1999, 7/3/1995    Tdap Vaccine 5/10/2011    Tuberculin Skin Test 2016  8:38 AM, 2016 " 12:36 PM      Below and/or attached are the medications your provider expects you to take. Review all of your home medications and newly ordered medications with your provider and/or pharmacist. Follow medication instructions as directed by your provider and/or pharmacist. Please keep your medication list with you and share with your provider. Update the information when medications are discontinued, doses are changed, or new medications (including over-the-counter products) are added; and carry medication information at all times in the event of emergency situations     Allergies:  No Known Allergies          Medications  Valid as of: July 06, 2017 -  4:30 PM    Generic Name Brand Name Tablet Size Instructions for use    Fluconazole (Tab) DIFLUCAN 150 MG Take 1 Tab by mouth every day.        .                 Medicines prescribed today were sent to:     [facility] [address] [place] [address] - [address] [place] [address]    Phone: [phone] Fax: [phone]    Open 24 Hours?: No      Medication refill instructions:       If your prescription bottle indicates you have medication refills left, it is not necessary to call your provider’s office. Please contact your pharmacy and they will refill your medication.    If your prescription bottle indicates you do not have any refills left, you may request refills at any time through one of the following ways: The online [employer] system (except Urgent Care), by calling your provider’s office, or by asking your pharmacy to contact your provider’s office with a refill request. Medication refills are processed only during regular business hours and may not be available until the next business day. Your provider may request additional information or to have a follow-up visit with you prior to refilling your medication.   *Please Note: Medication refills are assigned a new Rx number when refilled electronically. Your pharmacy may indicate that  no refills were authorized even though a new prescription for the same medication is available at the pharmacy. Please request the medicine by name with the pharmacy before contacting your provider for a refill.           [employer] Access Code: Activation code not generated  Current [employer] Status: Active

## 2017-07-06 NOTE — PROGRESS NOTES
Chief Complaint   Patient presents with   • Vaginal Discharge     vaginal itching starting yesterday        HISTORY OF PRESENT ILLNESS: Patient is a 21 y.o. female who presents today due to complaints of a yeast infection. States she has had vaginal itching and discharge since yesterday. Admits she has had reoccurring yeast infections for the past two years, this feels exactly similar. She has seen her OB/GYN for thisprescribed long-term Diflucan. She also takes probiotics daily. She does not have an appointment with her OB/GYN until August. She denies associated fever, chills, abdominal pain, changes in urination, or vaginal bleeding. States that she typically develops a yeast infection after her period, just finished her cycle. She has not tried anything this time for symptom relief as she states over-the-counter medications do not work. She is sexually active with one male, he is without symptoms.      There are no active problems to display for this patient.      Allergies:Review of patient's allergies indicates no known allergies.    Current Outpatient Prescriptions Ordered in [facility]   Medication Sig Dispense Refill   • fluconazole (DIFLUCAN) 150 MG tablet Take 1 Tab by mouth every day. 1 Tab 2     No current Epic-ordered facility-administered medications on file.       No past medical history on file.    Social History   Substance Use Topics   • Smoking status: Never Smoker    • Smokeless tobacco: Never Used   • Alcohol Use: No       Family Status   Relation Status Death Age   • Maternal Aunt     • Mother Alive    • Father Alive    • Sister Alive      Family History   Problem Relation Age of Onset   • Lung Disease Maternal Aunt      asthma       ROS:  Review of Systems   Constitutional: Negative for fever, chills, weight loss, malaise, and fatigue.   HENT: Negative for ear pain, nosebleeds, congestion, sore throat and neck pain.    Neuro: Negative for headache, sensory changes, weakness, seizure, LOC.  "  Cardiovascular: Negative for chest pain, palpitations, orthopnea and leg swelling.   Respiratory: Negative for cough, sputum production, shortness of breath and wheezing.   Gastrointestinal: Negative for abdominal pain, nausea, vomiting or diarrhea.   Genitourinary: Negative for dysuria, urgency and frequency. Positive for vaginal itching and discharge.  Musculoskeletal: Negative for falls, neck pain, back pain, joint pain, myalgias.   Skin: Negative for rash, diaphoresis.     Exam:  Blood pressure 100/64, pulse 74, temperature 37 °C (98.6 °F), resp. rate 12, height 1.575 m (5' 2\"), weight 55.339 kg (122 lb), last menstrual period 05/31/2017, SpO2 100 %.  General: well-nourished, well-developed female in NAD  Head: normocephalic, atraumatic  Eyes: PERRLA, no conjunctival injection, acuity grossly intact, lids normal.  Ears: normal shape and symmetry, gross auditory acuity is intact.  Nose: symmetrical without tenderness, no discharge.  Mouth/Throat: reasonable hygiene, no erythema, exudates or tonsillar enlargement.  Neck: no masses, range of motion within normal limits, no tracheal deviation. No obvious thyroid enlargement.   Lymph: no cervical adenopathy. No supraclavicular adenopathy.   Neuro: alert and oriented. Cranial nerves 1-12 grossly intact. No sensory deficit.   Cardiovascular: regular rate and rhythm. No edema.  Pulmonary: no distress. Chest is symmetrical with respiration, no wheezes, crackles, or rhonchi.   Abdomen: soft, non-tender, no guarding, no hepatosplenomegaly.  Musculoskeletal: no clubbing, appropriate muscle tone, gait is stable.  Skin: warm, dry, intact, no clubbing, no cyanosis, no rashes.   Psych: appropriate mood, affect, judgement.         Assessment/Plan:  1. Vaginal itching  fluconazole (DIFLUCAN) 150 MG tablet   2. Vaginal discharge  fluconazole (DIFLUCAN) 150 MG tablet       Diflucan as directed. Continue probiotics. Follow-up with OB/GYN next month as scheduled.  Supportive care, " differential diagnoses, and indications for immediate follow-up discussed with patient.   Pathogenesis of diagnosis discussed including typical length and natural progression.   Instructed to return to clinic or nearest emergency department for any change in condition, further concerns, or worsening of symptoms.  Patient states understanding of the plan of care and discharge instructions.  Instructed to make an appointment, for follow up, with their primary care provider.        Please note that this dictation was created using voice recognition software. I have made every reasonable attempt to correct obvious errors, but I expect that there are errors of grammar and possibly content that I did not discover before finalizing the note.      ALICIA Prater.

## 2017-07-20 ENCOUNTER — OFFICE VISIT (OUTPATIENT)
Dept: URGENT CARE | Facility: CLINIC | Age: 22
End: 2017-07-20
Payer: COMMERCIAL

## 2017-07-20 VITALS
DIASTOLIC BLOOD PRESSURE: 70 MMHG | BODY MASS INDEX: 22.45 KG/M2 | RESPIRATION RATE: 16 BRPM | SYSTOLIC BLOOD PRESSURE: 110 MMHG | WEIGHT: 122 LBS | HEIGHT: 62 IN | OXYGEN SATURATION: 98 % | TEMPERATURE: 98.6 F | HEART RATE: 82 BPM

## 2017-07-20 DIAGNOSIS — N76.0 ACUTE VAGINITIS: ICD-10-CM

## 2017-07-20 PROCEDURE — 99214 OFFICE O/P EST MOD 30 MIN: CPT | Performed by: FAMILY MEDICINE

## 2017-07-20 RX ORDER — FLUCONAZOLE 150 MG/1
150 TABLET ORAL
Qty: 3 TAB | Refills: 0 | Status: SHIPPED | OUTPATIENT
Start: 2017-07-20 | End: 2017-07-27

## 2017-07-20 RX ORDER — TERCONAZOLE 80 MG/1
80 SUPPOSITORY VAGINAL
Qty: 3 SUPPOSITORY | Refills: 0 | Status: SHIPPED | OUTPATIENT
Start: 2017-07-20 | End: 2017-07-23

## 2017-07-20 ASSESSMENT — ENCOUNTER SYMPTOMS
VAGINITIS: 1
CHILLS: 0
DIZZINESS: 0
FEVER: 0
FOCAL WEAKNESS: 0

## 2017-07-20 NOTE — MR AVS SNAPSHOT
"Kamila Kapadia   2017 5:00 PM   Office Visit   MRN: [id number]    Department:  [facility]   Dept Phone:  [phone]    Description:  Female : 1995   Provider:  Derek Arora M.D.           Reason for Visit     Vaginitis seen a week ago, given meds and not getting better, discharge, itchi      Allergies as of 2017     No Known Allergies      You were diagnosed with     Acute vaginitis   [325261]         Vital Signs     Blood Pressure Pulse Temperature Respirations Height Weight    110/70 mmHg 82 37 °C (98.6 °F) 16 1.575 m (5' 2\") 55.339 kg (122 lb)    Body Mass Index Oxygen Saturation Last Menstrual Period Breastfeeding? Smoking Status       22.31 kg/m2 98% 2017 No Never Smoker        Basic Information     Date Of Birth Sex Race Ethnicity Preferred Language    1995 Female White Non- English      Your appointments     Aug 31, 2017 10:45 AM   GYN Visit with Sonja Sanders M.D.   [facility]    [address]  [facility] [id number] [phone]              Health Maintenance        Date Due Completion Dates    IMM HEP A VACCINE (1 of 2 - Standard Series) 1994 ---    IMM HPV VACCINE (1 of 3 - Female 3 Dose Series) 2004 ---    IMM VARICELLA (CHICKENPOX) VACCINE (1 of 2 - 2 Dose Adolescent Series) 2006 ---    PAP SMEAR 2014 ---    IMM INFLUENZA (1) 2017 ---    IMM DTaP/Tdap/Td Vaccine (7 - Td) 5/10/2021 5/10/2011, 1999, 7/3/1995, 1994, 1994, 3/3/1994            Current Immunizations     Dtap Vaccine 1999, 7/3/1995, 1994, 1994, 3/3/1994    HIB Vaccine (ACTHIB/HIBERIX) 7/3/1995, 1994, 1994, 3/3/1994    Hepatitis B Vaccine Non-Recombivax (Ped/Adol) 1994, 1994, 3/3/1994    IPV 1999, 1994, 1994, 3/3/1994    MMR Vaccine 1999, 7/3/1995    Tdap Vaccine 5/10/2011    Tuberculin Skin Test 2016  8:38 AM, 2016 " 12:36 PM      Below and/or attached are the medications your provider expects you to take. Review all of your home medications and newly ordered medications with your provider and/or pharmacist. Follow medication instructions as directed by your provider and/or pharmacist. Please keep your medication list with you and share with your provider. Update the information when medications are discontinued, doses are changed, or new medications (including over-the-counter products) are added; and carry medication information at all times in the event of emergency situations     Allergies:  No Known Allergies          Medications  Valid as of: July 20, 2017 -  5:25 PM    Generic Name Brand Name Tablet Size Instructions for use    Fluconazole (Tab) DIFLUCAN 150 MG Take 1 Tab by mouth every 3 days for 3 doses.        Terconazole (Suppos) TERAZOL 80 MG Insert 1 Suppository in vagina every bedtime for 3 days.        .                 Medicines prescribed today were sent to:     [facility] [address] [place] [address]    Phone: [phone] Fax: [phone]    Open 24 Hours?: No      Medication refill instructions:       If your prescription bottle indicates you have medication refills left, it is not necessary to call your provider’s office. Please contact your pharmacy and they will refill your medication.    If your prescription bottle indicates you do not have any refills left, you may request refills at any time through one of the following ways: The online [employer] system (except Urgent Care), by calling your provider’s office, or by asking your pharmacy to contact your provider’s office with a refill request. Medication refills are processed only during regular business hours and may not be available until the next business day. Your provider may request additional information or to have a follow-up visit with you prior to refilling your medication.   *Please  Note: Medication refills are assigned a new Rx number when refilled electronically. Your pharmacy may indicate that no refills were authorized even though a new prescription for the same medication is available at the pharmacy. Please request the medicine by name with the pharmacy before contacting your provider for a refill.           [employer] Access Code: Activation code not generated  Current [email or web address] Status: Active

## 2017-07-21 NOTE — PROGRESS NOTES
"Subjective:      Kamila Kapadia is a 21 y.o. female who presents with Vaginitis    Chief Complaint   Patient presents with   • Vaginitis     seen a week ago, given meds and not getting better, discharge, itchi        - This is a very pleasant 21 y.o. female with complaints of white VD w/ itching/burning x 1-2 weeks. Got a little better w/ last treatment given here but symptoms returned full force about 3-4 days ago. Does not want vaginal exam/swabs done today           ALLERGIES:  Review of patient's allergies indicates no known allergies.     PMH:  No past medical history on file.     MEDS:    Current outpatient prescriptions:   •  fluconazole (DIFLUCAN) 150 MG tablet, Take 1 Tab by mouth every 3 days for 3 doses., Disp: 3 Tab, Rfl: 0  •  terconazole (TERAZOL) 80 MG Suppos, Insert 1 Suppository in vagina every bedtime for 3 days., Disp: 3 Suppository, Rfl: 0    ** I have documented what I find to be significant in regards to past medical, social, family and surgical history  in my HPI or under PMH/PSH/FH review section, otherwise it is contributory **             Vaginitis  Pertinent negatives include no chills or fever.       Review of Systems   Constitutional: Negative for fever and chills.   Neurological: Negative for dizziness and focal weakness.          Objective:     /70 mmHg  Pulse 82  Temp(Src) 37 °C (98.6 °F)  Resp 16  Ht 1.575 m (5' 2\")  Wt 55.339 kg (122 lb)  BMI 22.31 kg/m2  SpO2 98%  LMP 06/28/2017  Breastfeeding? No     Physical Exam   Constitutional: She appears well-developed. No distress.   HENT:   Head: Normocephalic and atraumatic.   Eyes: Conjunctivae are normal.   Neck: Neck supple.   Cardiovascular: Regular rhythm.    No murmur heard.  Pulmonary/Chest: Effort normal. No respiratory distress.   Abdominal: Soft. There is no tenderness.   Neurological: She is alert. She exhibits normal muscle tone.   Skin: Skin is warm and dry.   Psychiatric: She has a normal mood and affect. Judgment " normal.   Nursing note and vitals reviewed.              Assessment/Plan:         1. Acute vaginitis  fluconazole (DIFLUCAN) 150 MG tablet    terconazole (TERAZOL) 80 MG Suppos             Dx & d/c instructions discussed w/ patient and/or family members. Follow up w/ Prvt Dr or here in 3-4 days if not getting better, sooner if needed,  ER if worse and UC/PCP unavailable.        Possible side effects (i.e. Rash, GI upset/constipation, sedation, elevation of BP or sugars) of any medications given discussed.

## 2017-09-29 ENCOUNTER — HOSPITAL ENCOUNTER (EMERGENCY)
Facility: MEDICAL CENTER | Age: 22
End: 2017-09-29
Attending: EMERGENCY MEDICINE
Payer: COMMERCIAL

## 2017-09-29 VITALS
SYSTOLIC BLOOD PRESSURE: 81 MMHG | DIASTOLIC BLOOD PRESSURE: 66 MMHG | RESPIRATION RATE: 18 BRPM | TEMPERATURE: 98 F | BODY MASS INDEX: 21.09 KG/M2 | OXYGEN SATURATION: 97 % | HEART RATE: 90 BPM | WEIGHT: 115.3 LBS

## 2017-09-29 DIAGNOSIS — R55 SYNCOPE, UNSPECIFIED SYNCOPE TYPE: ICD-10-CM

## 2017-09-29 LAB — HCG UR QL: NEGATIVE

## 2017-09-29 PROCEDURE — 81025 URINE PREGNANCY TEST: CPT

## 2017-09-29 PROCEDURE — 99283 EMERGENCY DEPT VISIT LOW MDM: CPT

## 2017-09-29 PROCEDURE — 93005 ELECTROCARDIOGRAM TRACING: CPT | Performed by: EMERGENCY MEDICINE

## 2017-09-30 NOTE — DISCHARGE INSTRUCTIONS
Syncope  Syncope is a medical term for fainting or passing out. This means you lose consciousness and drop to the ground. People are generally unconscious for less than 5 minutes. You may have some muscle twitches for up to 15 seconds before waking up and returning to normal. Syncope occurs more often in older adults, but it can happen to anyone. While most causes of syncope are not dangerous, syncope can be a sign of a serious medical problem. It is important to seek medical care.   CAUSES   Syncope is caused by a sudden drop in blood flow to the brain. The specific cause is often not determined. Factors that can bring on syncope include:  · Taking medicines that lower blood pressure.  · Sudden changes in posture, such as standing up quickly.  · Taking more medicine than prescribed.  · Standing in one place for too long.  · Seizure disorders.  · Dehydration and excessive exposure to heat.  · Low blood sugar (hypoglycemia).  · Straining to have a bowel movement.  · Heart disease, irregular heartbeat, or other circulatory problems.  · Fear, emotional distress, seeing blood, or severe pain.  SYMPTOMS   Right before fainting, you may:  · Feel dizzy or light-headed.  · Feel nauseous.  · See all white or all black in your field of vision.  · Have cold, clammy skin.  DIAGNOSIS   Your health care provider will ask about your symptoms, perform a physical exam, and perform an electrocardiogram (ECG) to record the electrical activity of your heart. Your health care provider may also perform other heart or blood tests to determine the cause of your syncope which may include:  · Transthoracic echocardiogram (TTE). During echocardiography, sound waves are used to evaluate how blood flows through your heart.  · Transesophageal echocardiogram (TORO).  · Cardiac monitoring. This allows your health care provider to monitor your heart rate and rhythm in real time.  · Holter monitor. This is a portable device that records your  heartbeat and can help diagnose heart arrhythmias. It allows your health care provider to track your heart activity for several days, if needed.  · Stress tests by exercise or by giving medicine that makes the heart beat faster.  TREATMENT   In most cases, no treatment is needed. Depending on the cause of your syncope, your health care provider may recommend changing or stopping some of your medicines.  HOME CARE INSTRUCTIONS  · Have someone stay with you until you feel stable.  · Do not drive, use machinery, or play sports until your health care provider says it is okay.  · Keep all follow-up appointments as directed by your health care provider.  · Lie down right away if you start feeling like you might faint. Breathe deeply and steadily. Wait until all the symptoms have passed.  · Drink enough fluids to keep your urine clear or pale yellow.  · If you are taking blood pressure or heart medicine, get up slowly and take several minutes to sit and then stand. This can reduce dizziness.  SEEK IMMEDIATE MEDICAL CARE IF:   · You have a severe headache.  · You have unusual pain in the chest, abdomen, or back.  · You are bleeding from your mouth or rectum, or you have black or tarry stool.  · You have an irregular or very fast heartbeat.  · You have pain with breathing.  · You have repeated fainting or seizure-like jerking during an episode.  · You faint when sitting or lying down.  · You have confusion.  · You have trouble walking.  · You have severe weakness.  · You have vision problems.  If you fainted, call your local emergency services (911 in [place] Do not drive yourself to the hospital.      This information is not intended to replace advice given to you by your health care provider. Make sure you discuss any questions you have with your health care provider.     Document Released: 12/18/2006 Document Revised: 05/03/2016 Document Reviewed: 02/15/2013  [employer] Patient Education ©2016 [employer]
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
## 2017-09-30 NOTE — ED NOTES
Pt AOx4.  Pt given discharge instructions and pt verbalized understanding.  Pt ambulated to [facility].

## 2017-11-17 LAB — EKG IMPRESSION: NORMAL

## 2018-08-19 ENCOUNTER — OFFICE VISIT (OUTPATIENT)
Dept: URGENT CARE | Facility: PHYSICIAN GROUP | Age: 23
End: 2018-08-19
Payer: COMMERCIAL

## 2018-08-19 ENCOUNTER — HOSPITAL ENCOUNTER (OUTPATIENT)
Facility: MEDICAL CENTER | Age: 23
End: 2018-08-19
Attending: NURSE PRACTITIONER
Payer: COMMERCIAL

## 2018-08-19 VITALS
SYSTOLIC BLOOD PRESSURE: 108 MMHG | BODY MASS INDEX: 22.08 KG/M2 | WEIGHT: 120 LBS | OXYGEN SATURATION: 98 % | TEMPERATURE: 98.4 F | DIASTOLIC BLOOD PRESSURE: 72 MMHG | RESPIRATION RATE: 12 BRPM | HEIGHT: 62 IN | HEART RATE: 82 BPM

## 2018-08-19 DIAGNOSIS — B37.31 YEAST VAGINITIS: ICD-10-CM

## 2018-08-19 DIAGNOSIS — N89.8 VAGINAL DISCHARGE: ICD-10-CM

## 2018-08-19 LAB
APPEARANCE UR: NORMAL
BILIRUB UR STRIP-MCNC: NORMAL MG/DL
COLOR UR AUTO: NORMAL
GLUCOSE UR STRIP.AUTO-MCNC: NORMAL MG/DL
INT CON NEG: NEGATIVE
INT CON POS: POSITIVE
KETONES UR STRIP.AUTO-MCNC: NORMAL MG/DL
LEUKOCYTE ESTERASE UR QL STRIP.AUTO: NORMAL
NITRITE UR QL STRIP.AUTO: NORMAL
PH UR STRIP.AUTO: 7.5 [PH] (ref 5–8)
POC URINE PREGNANCY TEST: NEGATIVE
PROT UR QL STRIP: NORMAL MG/DL
RBC UR QL AUTO: NORMAL
SP GR UR STRIP.AUTO: 1.02
UROBILINOGEN UR STRIP-MCNC: NORMAL MG/DL

## 2018-08-19 PROCEDURE — 87510 GARDNER VAG DNA DIR PROBE: CPT

## 2018-08-19 PROCEDURE — 87480 CANDIDA DNA DIR PROBE: CPT

## 2018-08-19 PROCEDURE — 87660 TRICHOMONAS VAGIN DIR PROBE: CPT

## 2018-08-19 PROCEDURE — 87491 CHLMYD TRACH DNA AMP PROBE: CPT

## 2018-08-19 PROCEDURE — 87591 N.GONORRHOEAE DNA AMP PROB: CPT

## 2018-08-19 PROCEDURE — 81025 URINE PREGNANCY TEST: CPT | Performed by: NURSE PRACTITIONER

## 2018-08-19 PROCEDURE — 99214 OFFICE O/P EST MOD 30 MIN: CPT | Performed by: NURSE PRACTITIONER

## 2018-08-19 PROCEDURE — 81002 URINALYSIS NONAUTO W/O SCOPE: CPT | Performed by: NURSE PRACTITIONER

## 2018-08-19 RX ORDER — FLUCONAZOLE 100 MG/1
100 TABLET ORAL DAILY
Qty: 3 TAB | Refills: 3 | Status: SHIPPED | OUTPATIENT
Start: 2018-08-19 | End: 2018-08-22

## 2018-08-19 ASSESSMENT — ENCOUNTER SYMPTOMS
NAUSEA: 0
DIARRHEA: 0
BACK PAIN: 0
FEVER: 0
DIZZINESS: 0
VOMITING: 0
FLANK PAIN: 0
HEADACHES: 0
ORTHOPNEA: 0
CHILLS: 0
ABDOMINAL PAIN: 0

## 2018-08-20 ENCOUNTER — TELEPHONE (OUTPATIENT)
Dept: URGENT CARE | Facility: PHYSICIAN GROUP | Age: 23
End: 2018-08-20

## 2018-08-20 DIAGNOSIS — N89.8 VAGINAL DISCHARGE: ICD-10-CM

## 2018-08-20 LAB
CANDIDA DNA VAG QL PROBE+SIG AMP: POSITIVE
G VAGINALIS DNA VAG QL PROBE+SIG AMP: NEGATIVE
T VAGINALIS DNA VAG QL PROBE+SIG AMP: NEGATIVE

## 2018-08-20 NOTE — PROGRESS NOTES
"Subjective:      Kamila Kapadia is a 22 y.o. female who presents with Vaginal Discharge (started this AM, chronic issue )            HPI New problem. 22 year old female with vaginal discharge that started this morning. She reports that this is thick, white. Denies abdominal pain, back pain, fever, chills or myalgia. She has no urinary symptom. Has not taken any medication for this. Long standing history of vaginal issues. No GYN at this time.  Patient has no known allergies.  No current outpatient prescriptions on file prior to visit.     No current facility-administered medications on file prior to visit.      Social History     Social History   • Marital status: Single     Spouse name: N/A   • Number of children: 0   • Years of education: HS     Occupational History   • Senior in High School Child     Social History Main Topics   • Smoking status: Never Smoker   • Smokeless tobacco: Never Used   • Alcohol use No   • Drug use: No   • Sexual activity: Not Currently     Partners: Male     Other Topics Concern   • Not on file     Social History Narrative   • No narrative on file     family history includes Lung Disease in her maternal aunt.      Review of Systems   Constitutional: Negative for chills and fever.   Cardiovascular: Negative for chest pain and orthopnea.   Gastrointestinal: Negative for abdominal pain, diarrhea, nausea and vomiting.   Genitourinary: Negative for dysuria, flank pain, frequency, hematuria and urgency.        +thick vaginal discharge.   Musculoskeletal: Negative for back pain.   Neurological: Negative for dizziness and headaches.          Objective:     /72   Pulse 82   Temp 36.9 °C (98.4 °F)   Resp 12   Ht 1.575 m (5' 2\")   Wt 54.4 kg (120 lb)   LMP 08/12/2018   SpO2 98%   BMI 21.95 kg/m²      Physical Exam   Constitutional: She is oriented to person, place, and time. She appears well-developed and well-nourished. No distress.   Cardiovascular: Normal rate, regular rhythm and " normal heart sounds.    No murmur heard.  Pulmonary/Chest: Effort normal and breath sounds normal.   Genitourinary: Pelvic exam was performed with patient supine. No bleeding in the vagina. No foreign body in the vagina. Vaginal discharge found.   Genitourinary Comments: Thick white discharge that is adherent to walls of vagina. Consistent with yeast.   Musculoskeletal: Normal range of motion.   Neurological: She is alert and oriented to person, place, and time.   Skin: Skin is warm and dry.   Nursing note and vitals reviewed.              Assessment/Plan:     1. Vaginal discharge  POCT Urinalysis    POCT Pregnancy    VAGINAL PATHOGENS DNA PANEL    CHLAMYDIA/GC PCR URINE OR SWAB   2. Yeast vaginitis  fluconazole (DIFLUCAN) 100 MG Tab     Differential diagnosis, natural history, supportive care, and indications for immediate follow-up discussed at length.

## 2018-08-21 LAB
C TRACH DNA SPEC QL NAA+PROBE: NEGATIVE
N GONORRHOEA DNA SPEC QL NAA+PROBE: NEGATIVE
SPECIMEN SOURCE: NORMAL

## 2018-08-24 ENCOUNTER — TELEPHONE (OUTPATIENT)
Dept: URGENT CARE | Facility: CLINIC | Age: 23
End: 2018-08-24